# Patient Record
(demographics unavailable — no encounter records)

---

## 2025-01-16 NOTE — PHYSICAL EXAM
[FreeTextEntry3] : inflamed red papules and pustules and comedones on the cheeks and forhead face mild scarring

## 2025-01-16 NOTE — ASSESSMENT
[Use of independent historian: [ enter independent historian's relationship to patient ] :____] : As the patient was unable to provide a complete and reliable history, I obtained clinical history from the patient's [unfilled] [FreeTextEntry1] : #Acne vulgaris, inflammatory, moderate - chronic flaring  - Discussed nature, chronicity and unpredictable course - We have discussed importance of sun protection  - c/w tretinoin 0.05% cream nightly for now, will stop next mo  - Plan to start accutane in 1 mo  - Abstinent  - Ipledge ID: REMS ID [id number]  # High risk medication use - Baseline hepatic profile, lipid profile ( 01/14/2025 ), will repeat following max dose - Negative urine POCT urine pregnancy test today  -- Extensive discussion with the patient regarding Accutane. SED including but not limited to dryness of the skin/mucosa, bone or muscle pain, elevation in triglycerides, need for periodic lab monitoring. Concern for madhavi depression and suicidality has not been proven to the extent of a causal effect. Also possible association for IBD. -- Patient should not have cosmetic and/or elective procedures during treatment, should not share this medicine, cannot donate blood while on this medicine or for one month after treatment, and should not drink while taking this medication. -- Patient should not take tetracyclines while on this medication or use any other topicals while on this medication. Counseled to use gentle cleanser, apply daily sun protection, and frequent emollient use. Patient should take this medication with food (ie fatty meal). -- Patient is aware of teratogenic risk if female takes medication. Patient verbalizes understanding of all. Counseling regarding pregnancy prevention done. Accutane is Category X for pregnancy, meaning it will cause fetal teratogenic malformations, and pregnancy MUST be avoided while on this drug. Patient is aware of the risk of birth defects while on isotretinoin and has pledged to abstinence during treatment and for at least one month after her last dose.   No history of depression/suicidal ideation. No personal of family history of IBD.

## 2025-01-16 NOTE — HISTORY OF PRESENT ILLNESS
[FreeTextEntry1] : amy acne [de-identified] : YULI HEDRICK  is a 15 year old F who presents for the following: #Acne vulgaris: using tret 0.05% daily, still flaring, wants to start accutane, not related to period that she can tell, not on BC, abstinent  Prior hx:  - In the past was tx with doxycycline and "many" other creams from outside derm - prev using non-comedogenic cosmetics and acne has significantly improved   No personal history of depression/anxiety/SI.  No personal or family history of IBD.   SH: in 10th grade with Dr. Crum's son

## 2025-01-16 NOTE — HISTORY OF PRESENT ILLNESS
[FreeTextEntry1] : amy acne [de-identified] : YULI HEDRICK  is a 15 year old F who presents for the following: #Acne vulgaris: using tret 0.05% daily, still flaring, wants to start accutane, not related to period that she can tell, not on BC, abstinent  Prior hx:  - In the past was tx with doxycycline and "many" other creams from outside derm - prev using non-comedogenic cosmetics and acne has significantly improved   No personal history of depression/anxiety/SI.  No personal or family history of IBD.   SH: in 10th grade with Dr. Crum's son

## 2025-01-16 NOTE — ASSESSMENT
[Use of independent historian: [ enter independent historian's relationship to patient ] :____] : As the patient was unable to provide a complete and reliable history, I obtained clinical history from the patient's [unfilled] [FreeTextEntry1] : #Acne vulgaris, inflammatory, moderate - chronic flaring  - Discussed nature, chronicity and unpredictable course - We have discussed importance of sun protection  - c/w tretinoin 0.05% cream nightly for now, will stop next mo  - Plan to start accutane in 1 mo  - Abstinent  - Ipledge ID: REMS ID [id number]  # High risk medication use - Baseline hepatic profile, lipid profile ( 01/14/2025 ), will repeat following max dose - Negative urine POCT urine pregnancy test today  -- Extensive discussion with the patient regarding Accutane. SED including but not limited to dryness of the skin/mucosa, bone or muscle pain, elevation in triglycerides, need for periodic lab monitoring. Concern for madhavi depression and suicidality has not been proven to the extent of a causal effect. Also possible association for IBD. -- Patient should not have cosmetic and/or elective procedures during treatment, should not share this medicine, cannot donate blood while on this medicine or for one month after treatment, and should not drink while taking this medication. -- Patient should not take tetracyclines while on this medication or use any other topicals while on this medication. Counseled to use gentle cleanser, apply daily sun protection, and frequent emollient use. Patient should take this medication with food (ie fatty meal). -- Patient is aware of teratogenic risk if female takes medication. Patient verbalizes understanding of all. Counseling regarding pregnancy prevention done. Accutane is Category X for pregnancy, meaning it will cause fetal teratogenic malformations, and pregnancy MUST be avoided while on this drug. Patient is aware of the risk of birth defects while on isotretinoin and has pledged to abstinence during treatment and for at least one month after her last dose.   No history of depression/suicidal ideation. No personal of family history of IBD.

## 2025-02-18 NOTE — HISTORY OF PRESENT ILLNESS
[FreeTextEntry1] : amy acne [de-identified] : 15-year-old F last seen one month ago, presenting today with her mom for follow-up AV. Here to start isotretinoin. In the past was tx with doxycycline and "many" other creams from outside derm. No improvement with tret 0.05%. No OCP- endorses abstinence. No personal history of depression/anxiety/SI.  No personal or family history of IBD.   SH: in 10th grade with Dr. Crum's son

## 2025-02-18 NOTE — ASSESSMENT
[Use of independent historian: [ enter independent historian's relationship to patient ] :____] : As the patient was unable to provide a complete and reliable history, I obtained clinical history from the patient's [unfilled] [FreeTextEntry1] : 1) Acne vulgaris, inflammatory, moderate - chronic flaring  - Discussed nature, chronicity and unpredictable course - Stop tretinoin 0.05% cream  - Weight: 61.24 kg  - Abstinent  - Ipledge ID: REMS ID [id number]  I confirmed Patient in iPledge myself, assisted in setting up iPledge account, and had her answer questions in the room before sending medication.   Start isotretinoin 20 mg PO daily, take with a fatty meal.  2) High risk medication use - Baseline hepatic profile and lipid profile 01/14/2025 WNL.  - Negative urine POCT urine pregnancy test today  -- Extensive discussion with the patient regarding Accutane. SED including but not limited to dryness of the skin/mucosa, bone or muscle pain, elevation in triglycerides, need for periodic lab monitoring. Concern for madhavi depression and suicidality has not been proven to the extent of a causal effect. Also possible association for IBD. -- Patient should not have cosmetic and/or elective procedures during treatment, should not share this medicine, cannot donate blood while on this medicine or for one month after treatment, and should not drink while taking this medication. -- Patient should not take tetracyclines while on this medication or use any other topicals while on this medication. Counseled to use gentle cleanser, apply daily sun protection, and frequent emollient use. Patient should take this medication with food (ie fatty meal). -- Patient is aware of teratogenic risk if female takes medication. Patient verbalizes understanding of all. Counseling regarding pregnancy prevention done. Accutane is Category X for pregnancy, meaning it will cause fetal teratogenic malformations, and pregnancy MUST be avoided while on this drug. Patient is aware of the risk of birth defects while on isotretinoin and has pledged to abstinence during treatment and for at least one month after her last dose.   No history of depression/suicidal ideation. No personal of family history of IBD.  RTC 1 month

## 2025-02-18 NOTE — PHYSICAL EXAM
[FreeTextEntry3] : Focused skin exam performed  The relevant portions of the exam were performed today  AAOx3, NAD, well-appearing / pleasant Focused examination within normal limits with the exception of:  inflamed red papules and pustules and comedones on the cheeks and forehead mild scarring

## 2025-02-18 NOTE — ASSESSMENT
[Use of independent historian: [ enter independent historian's relationship to patient ] :____] : As the patient was unable to provide a complete and reliable history, I obtained clinical history from the patient's [unfilled] [FreeTextEntry1] : 1) Acne vulgaris, inflammatory, moderate - chronic flaring  - Discussed nature, chronicity and unpredictable course - Stop tretinoin 0.05% cream  - Weight: 61.24 kg  - Abstinent  - Ipledge ID: REMS ID [id number]  I confirmed Patient in iPledge myself, assisted in setting up iPledge account, and had her answer questions in the room before sending medication.   Start isotretinoin 20 mg PO daily, take with a fatty meal.  2) High risk medication use - Baseline hepatic profile and lipid profile 01/14/2025 WNL.  - Negative urine POCT urine pregnancy test today  -- Extensive discussion with the patient regarding Accutane. SED including but not limited to dryness of the skin/mucosa, bone or muscle pain, elevation in triglycerides, need for periodic lab monitoring. Concern for madhavi depression and suicidality has not been proven to the extent of a causal effect. Also possible association for IBD. -- Patient should not have cosmetic and/or elective procedures during treatment, should not share this medicine, cannot donate blood while on this medicine or for one month after treatment, and should not drink while taking this medication. -- Patient should not take tetracyclines while on this medication or use any other topicals while on this medication. Counseled to use gentle cleanser, apply daily sun protection, and frequent emollient use. Patient should take this medication with food (ie fatty meal). -- Patient is aware of teratogenic risk if female takes medication. Patient verbalizes understanding of all. Counseling regarding pregnancy prevention done. Accutane is Category X for pregnancy, meaning it will cause fetal teratogenic malformations, and pregnancy MUST be avoided while on this drug. Patient is aware of the risk of birth defects while on isotretinoin and has pledged to abstinence during treatment and for at least one month after her last dose.   No history of depression/suicidal ideation. No personal of family history of IBD.  RTC 1 month

## 2025-02-18 NOTE — END OF VISIT
[Time Spent: ___ minutes] : I have spent [unfilled] minutes of time on the encounter which excludes teaching and separately reported services. [TextEntry] : Physician Assistant Statement: Case was discussed and supervised by attending physician.

## 2025-02-18 NOTE — HISTORY OF PRESENT ILLNESS
[FreeTextEntry1] : amy acne [de-identified] : 15-year-old F last seen one month ago, presenting today with her mom for follow-up AV. Here to start isotretinoin. In the past was tx with doxycycline and "many" other creams from outside derm. No improvement with tret 0.05%. No OCP- endorses abstinence. No personal history of depression/anxiety/SI.  No personal or family history of IBD.   SH: in 10th grade with Dr. Crum's son

## 2025-03-13 NOTE — PHYSICAL EXAM
[FreeTextEntry3] : Focused skin exam performed  The relevant portions of the exam were performed today  AAOx3, NAD, well-appearing / pleasant Focused examination within normal limits with the exception of:  Unable to examine face due to makeup but able to appreciate inflammatory papules on the forehead and cheeks

## 2025-03-13 NOTE — HISTORY OF PRESENT ILLNESS
[FreeTextEntry1] : amy acne [de-identified] : 16-year-old F last seen one month ago, presenting today with her mom for follow-up AV. S/p 1 month of isotretinoin at 20 mg PO daily, tolerating well. Notes dryness on the lips, no pain or itching. Denies joint pain, muscle aches, abdominal pain, bloody diarrhea, mood changes, and headaches.   History In the past was tx with doxycycline and "many" other creams from outside derm. No improvement with tret 0.05%. No OCP- endorses abstinence. No personal history of depression/anxiety/SI.  No personal or family history of IBD.   SH: in 10th grade with Dr. Crum's son

## 2025-03-13 NOTE — ASSESSMENT
[Use of independent historian: [ enter independent historian's relationship to patient ] :____] : As the patient was unable to provide a complete and reliable history, I obtained clinical history from the patient's [unfilled] [FreeTextEntry1] : 1) Acne vulgaris, inflammatory, moderate - chronic flaring  - Discussed nature, chronicity and unpredictable course - Stop tretinoin 0.05% cream  - Cumulative dose: 600 mg - Goal dose: 7,320 mg - 9,150 mg (120-150 mg/kg) - Weight: 61.24 kg  - Abstinent  - Ipledge ID: REMS ID [id number]  Pending normal labs below, increase isotretinoin 40 mg PO daily, take with a fatty meal. I confirmed Patient in iPledge myself.  2) High risk medication use - Repeat CBC, CMP, and lipids today. - Baseline hepatic profile and lipid profile 01/14/2025 WNL.  - Negative urine POCT urine pregnancy test today  -- Extensive discussion with the patient regarding Accutane. SED including but not limited to dryness of the skin/mucosa, bone or muscle pain, elevation in triglycerides, need for periodic lab monitoring. Concern for madhavi depression and suicidality has not been proven to the extent of a causal effect. Also possible association for IBD. -- Patient should not have cosmetic and/or elective procedures during treatment, should not share this medicine, cannot donate blood while on this medicine or for one month after treatment, and should not drink while taking this medication. -- Patient should not take tetracyclines while on this medication or use any other topicals while on this medication. Counseled to use gentle cleanser, apply daily sun protection, and frequent emollient use. Patient should take this medication with food (ie fatty meal). -- Patient is aware of teratogenic risk if female takes medication. Patient verbalizes understanding of all. Counseling regarding pregnancy prevention done. Accutane is Category X for pregnancy, meaning it will cause fetal teratogenic malformations, and pregnancy MUST be avoided while on this drug. Patient is aware of the risk of birth defects while on isotretinoin and has pledged to abstinence during treatment and for at least one month after her last dose.   No history of depression/suicidal ideation. No personal of family history of IBD.  RTC 1 month

## 2025-03-13 NOTE — HISTORY OF PRESENT ILLNESS
[FreeTextEntry1] : amy acne [de-identified] : 16-year-old F last seen one month ago, presenting today with her mom for follow-up AV. S/p 1 month of isotretinoin at 20 mg PO daily, tolerating well. Notes dryness on the lips, no pain or itching. Denies joint pain, muscle aches, abdominal pain, bloody diarrhea, mood changes, and headaches.   History In the past was tx with doxycycline and "many" other creams from outside derm. No improvement with tret 0.05%. No OCP- endorses abstinence. No personal history of depression/anxiety/SI.  No personal or family history of IBD.   SH: in 10th grade with Dr. Crum's son

## 2025-03-13 NOTE — ASSESSMENT
[Use of independent historian: [ enter independent historian's relationship to patient ] :____] : As the patient was unable to provide a complete and reliable history, I obtained clinical history from the patient's [unfilled] [FreeTextEntry1] : 1) Acne vulgaris, inflammatory, moderate - chronic flaring  - Discussed nature, chronicity and unpredictable course - Stop tretinoin 0.05% cream  - Cumulative dose: 600 mg - Goal dose: 7,320 mg - 9,150 mg (120-150 mg/kg) - Weight: 61.24 kg  - Abstinent  - Ipledge ID: REMS ID [id number]  Pending normal labs below, increase isotretinoin 40 mg PO daily, take with a fatty meal. I confirmed Patient in iPledge myself.  2) High risk medication use - Repeat CBC, CMP, and lipids today. - Baseline hepatic profile and lipid profile 01/14/2025 WNL.  - Negative urine POCT urine pregnancy test today  -- Extensive discussion with the patient regarding Accutane. SED including but not limited to dryness of the skin/mucosa, bone or muscle pain, elevation in triglycerides, need for periodic lab monitoring. Concern for madhavi depression and suicidality has not been proven to the extent of a causal effect. Also possible association for IBD. -- Patient should not have cosmetic and/or elective procedures during treatment, should not share this medicine, cannot donate blood while on this medicine or for one month after treatment, and should not drink while taking this medication. -- Patient should not take tetracyclines while on this medication or use any other topicals while on this medication. Counseled to use gentle cleanser, apply daily sun protection, and frequent emollient use. Patient should take this medication with food (ie fatty meal). -- Patient is aware of teratogenic risk if female takes medication. Patient verbalizes understanding of all. Counseling regarding pregnancy prevention done. Accutane is Category X for pregnancy, meaning it will cause fetal teratogenic malformations, and pregnancy MUST be avoided while on this drug. Patient is aware of the risk of birth defects while on isotretinoin and has pledged to abstinence during treatment and for at least one month after her last dose.   No history of depression/suicidal ideation. No personal of family history of IBD.  RTC 1 month

## 2025-04-10 NOTE — HISTORY OF PRESENT ILLNESS
[FreeTextEntry1] : amy acne [de-identified] : 16-year-old F last seen one month ago, presenting today with her mom for follow-up AV. S/p 2 months of isotretinoin now at 40 mg PO daily, tolerating well. Notes dryness on the lips, no pain or itching. Denies joint pain, muscle aches, abdominal pain, bloody diarrhea, mood changes, and headaches.   History In the past was tx with doxycycline and "many" other creams from outside derm. No improvement with tret 0.05%. No OCP- endorses abstinence. No personal history of depression/anxiety/SI.  No personal or family history of IBD.   SH: in 10th grade with Dr. Crum's son

## 2025-04-10 NOTE — ASSESSMENT
[Use of independent historian: [ enter independent historian's relationship to patient ] :____] : As the patient was unable to provide a complete and reliable history, I obtained clinical history from the patient's [unfilled] [FreeTextEntry1] : 1) Acne vulgaris, inflammatory, moderate - chronic flaring  - Discussed nature, chronicity and unpredictable course - Stop tretinoin 0.05% cream  - Cumulative dose: 1800 mg - Goal dose: 7,320 mg - 9,150 mg (120-150 mg/kg) - Weight: 61.24 kg  - Abstinent  - Ipledge ID: REMS ID [id number]  Increase isotretinoin to 60 mg PO daily, take with a fatty meal.  I confirmed Patient in iPledge myself.  2) High risk medication use - CBC, lipids, and CMP WNL from . - Baseline hepatic profile and lipid profile 01/14/2025 WNL.  - Negative urine POCT urine pregnancy test today - Repeat CBC, lipids, and CMP at NEXT visit.  -- Extensive discussion with the patient regarding Accutane. SED including but not limited to dryness of the skin/mucosa, bone or muscle pain, elevation in triglycerides, need for periodic lab monitoring. Concern for madhavi depression and suicidality has not been proven to the extent of a causal effect. Also possible association for IBD. -- Patient should not have cosmetic and/or elective procedures during treatment, should not share this medicine, cannot donate blood while on this medicine or for one month after treatment, and should not drink while taking this medication. -- Patient should not take tetracyclines while on this medication or use any other topicals while on this medication. Counseled to use gentle cleanser, apply daily sun protection, and frequent emollient use. Patient should take this medication with food (ie fatty meal). -- Patient is aware of teratogenic risk if female takes medication. Patient verbalizes understanding of all. Counseling regarding pregnancy prevention done. Accutane is Category X for pregnancy, meaning it will cause fetal teratogenic malformations, and pregnancy MUST be avoided while on this drug. Patient is aware of the risk of birth defects while on isotretinoin and has pledged to abstinence during treatment and for at least one month after her last dose.   No history of depression/suicidal ideation. No personal of family history of IBD.  RTC 1 month

## 2025-04-10 NOTE — HISTORY OF PRESENT ILLNESS
[FreeTextEntry1] : amy acne [de-identified] : 16-year-old F last seen one month ago, presenting today with her mom for follow-up AV. S/p 2 months of isotretinoin now at 40 mg PO daily, tolerating well. Notes dryness on the lips, no pain or itching. Denies joint pain, muscle aches, abdominal pain, bloody diarrhea, mood changes, and headaches.   History In the past was tx with doxycycline and "many" other creams from outside derm. No improvement with tret 0.05%. No OCP- endorses abstinence. No personal history of depression/anxiety/SI.  No personal or family history of IBD.   SH: in 10th grade with Dr. Crum's son

## 2025-04-10 NOTE — END OF VISIT
Winlevi Counseling:  I discussed with the patient the risks of topical clascoterone including but not limited to erythema, scaling, itching, and stinging. Patient voiced their understanding. Birth Control Pills Counseling: Birth Control Pill Counseling: I discussed with the patient the potential side effects of OCPs including but not limited to increased risk of stroke, heart attack, thrombophlebitis, deep venous thrombosis, hepatic adenomas, breast changes, GI upset, headaches, and depression. The patient verbalized understanding of the proper use and possible adverse effects of OCPs. All of the patient's questions and concerns were addressed. Doxycycline Counseling:  Patient counseled regarding possible photosensitivity and increased risk for sunburn. Patient instructed to avoid sunlight, if possible. When exposed to sunlight, patients should wear protective clothing, sunglasses, and sunscreen. The patient was instructed to call the office immediately if the following severe adverse effects occur:  hearing changes, easy bruising/bleeding, severe headache, or vision changes. The patient verbalized understanding of the proper use and possible adverse effects of doxycycline. All of the patient's questions and concerns were addressed. [TextEntry] : Physician Assistant Statement: Case was discussed and supervised by attending physician. Topical Clindamycin Pregnancy And Lactation Text: This medication is Pregnancy Category B and is considered safe during pregnancy. It is unknown if it is excreted in breast milk. Isotretinoin Pregnancy And Lactation Text: This medication is Pregnancy Category X and is considered extremely dangerous during pregnancy. It is unknown if it is excreted in breast milk. Minocycline Pregnancy And Lactation Text: This medication is Pregnancy Category D and not consider safe during pregnancy. It is also excreted in breast milk. Tetracycline Counseling: Patient counseled regarding possible photosensitivity and increased risk for sunburn. Patient instructed to avoid sunlight, if possible. When exposed to sunlight, patients should wear protective clothing, sunglasses, and sunscreen. The patient was instructed to call the office immediately if the following severe adverse effects occur:  hearing changes, easy bruising/bleeding, severe headache, or vision changes. The patient verbalized understanding of the proper use and possible adverse effects of tetracycline. All of the patient's questions and concerns were addressed. Patient understands to avoid pregnancy while on therapy due to potential birth defects. Include Pregnancy/Lactation Warning?: No Azelaic Acid Pregnancy And Lactation Text: This medication is considered safe during pregnancy and breast feeding. Topical Retinoid counseling:  Patient advised to apply a pea-sized amount only at bedtime and wait 30 minutes after washing their face before applying. If too drying, patient may add a non-comedogenic moisturizer. The patient verbalized understanding of the proper use and possible adverse effects of retinoids. All of the patient's questions and concerns were addressed. Tazorac Counseling:  Patient advised that medication is irritating and drying. Patient may need to apply sparingly and wash off after an hour before eventually leaving it on overnight. The patient verbalized understanding of the proper use and possible adverse effects of tazorac. All of the patient's questions and concerns were addressed. Topical Sulfur Applications Counseling: Topical Sulfur Counseling: Patient counseled that this medication may cause skin irritation or allergic reactions. In the event of skin irritation, the patient was advised to reduce the amount of the drug applied or use it less frequently. The patient verbalized understanding of the proper use and possible adverse effects of topical sulfur application. All of the patient's questions and concerns were addressed. Winlevi Pregnancy And Lactation Text: This medication is considered safe during pregnancy and breastfeeding. Birth Control Pills Pregnancy And Lactation Text: This medication should be avoided if pregnant and for the first 30 days post-partum. Doxycycline Pregnancy And Lactation Text: This medication is Pregnancy Category D and not consider safe during pregnancy. It is also excreted in breast milk but is considered safe for shorter treatment courses. Azithromycin Counseling:  I discussed with the patient the risks of azithromycin including but not limited to GI upset, allergic reaction, drug rash, diarrhea, and yeast infections. High Dose Vitamin A Counseling: Side effects reviewed, pt to contact office should one occur. Detail Level: Simple Topical Retinoid Pregnancy And Lactation Text: This medication is Pregnancy Category C. It is unknown if this medication is excreted in breast milk. Sarecycline Counseling: Patient advised regarding possible photosensitivity and discoloration of the teeth, skin, lips, tongue and gums. Patient instructed to avoid sunlight, if possible. When exposed to sunlight, patients should wear protective clothing, sunglasses, and sunscreen. The patient was instructed to call the office immediately if the following severe adverse effects occur:  hearing changes, easy bruising/bleeding, severe headache, or vision changes. The patient verbalized understanding of the proper use and possible adverse effects of sarecycline. All of the patient's questions and concerns were addressed. Spironolactone Counseling: Patient advised regarding risks of diarrhea, abdominal pain, hyperkalemia, birth defects (for female patients), liver toxicity and renal toxicity. The patient may need blood work to monitor liver and kidney function and potassium levels while on therapy. The patient verbalized understanding of the proper use and possible adverse effects of spironolactone. All of the patient's questions and concerns were addressed. Erythromycin Pregnancy And Lactation Text: This medication is Pregnancy Category B and is considered safe during pregnancy. It is also excreted in breast milk. Benzoyl Peroxide Counseling: Patient counseled that medicine may cause skin irritation and bleach clothing. In the event of skin irritation, the patient was advised to reduce the amount of the drug applied or use it less frequently. The patient verbalized understanding of the proper use and possible adverse effects of benzoyl peroxide. All of the patient's questions and concerns were addressed. High Dose Vitamin A Pregnancy And Lactation Text: High dose vitamin A therapy is contraindicated during pregnancy and breast feeding. Tazorac Pregnancy And Lactation Text: This medication is not safe during pregnancy. It is unknown if this medication is excreted in breast milk. Azithromycin Pregnancy And Lactation Text: This medication is considered safe during pregnancy and is also secreted in breast milk. Dapsone Counseling: I discussed with the patient the risks of dapsone including but not limited to hemolytic anemia, agranulocytosis, rashes, methemoglobinemia, kidney failure, peripheral neuropathy, headaches, GI upset, and liver toxicity. Patients who start dapsone require monitoring including baseline LFTs and weekly CBCs for the first month, then every month thereafter. The patient verbalized understanding of the proper use and possible adverse effects of dapsone. All of the patient's questions and concerns were addressed. Topical Sulfur Applications Pregnancy And Lactation Text: This medication is Pregnancy Category C and has an unknown safety profile during pregnancy. It is unknown if this topical medication is excreted in breast milk. Erythromycin Counseling:  I discussed with the patient the risks of erythromycin including but not limited to GI upset, allergic reaction, drug rash, diarrhea, increase in liver enzymes, and yeast infections. Isotretinoin Counseling: Patient should get monthly blood tests, not donate blood, not drive at night if vision affected, not share medication, and not undergo elective surgery for 6 months after tx completed. Side effects reviewed, pt to contact office should one occur. Bactrim Pregnancy And Lactation Text: This medication is Pregnancy Category D and is known to cause fetal risk. It is also excreted in breast milk. Topical Clindamycin Counseling: Patient counseled that this medication may cause skin irritation or allergic reactions. In the event of skin irritation, the patient was advised to reduce the amount of the drug applied or use it less frequently. The patient verbalized understanding of the proper use and possible adverse effects of clindamycin. All of the patient's questions and concerns were addressed. Minocycline Counseling: Patient advised regarding possible photosensitivity and discoloration of the teeth, skin, lips, tongue and gums. Patient instructed to avoid sunlight, if possible. When exposed to sunlight, patients should wear protective clothing, sunglasses, and sunscreen. The patient was instructed to call the office immediately if the following severe adverse effects occur:  hearing changes, easy bruising/bleeding, severe headache, or vision changes. The patient verbalized understanding of the proper use and possible adverse effects of minocycline. All of the patient's questions and concerns were addressed. Spironolactone Pregnancy And Lactation Text: This medication can cause feminization of the male fetus and should be avoided during pregnancy. The active metabolite is also found in breast milk. Dapsone Pregnancy And Lactation Text: This medication is Pregnancy Category C and is not considered safe during pregnancy or breast feeding. Benzoyl Peroxide Pregnancy And Lactation Text: This medication is Pregnancy Category C. It is unknown if benzoyl peroxide is excreted in breast milk. Azelaic Acid Counseling: Patient counseled that medicine may cause skin irritation and to avoid applying near the eyes. In the event of skin irritation, the patient was advised to reduce the amount of the drug applied or use it less frequently. The patient verbalized understanding of the proper use and possible adverse effects of azelaic acid. All of the patient's questions and concerns were addressed. Bactrim Counseling:  I discussed with the patient the risks of sulfa antibiotics including but not limited to GI upset, allergic reaction, drug rash, diarrhea, dizziness, photosensitivity, and yeast infections. Rarely, more serious reactions can occur including but not limited to aplastic anemia, agranulocytosis, methemoglobinemia, blood dyscrasias, liver or kidney failure, lung infiltrates or desquamative/blistering drug rashes. Detail Level: Zone Detail Level: Detailed

## 2025-04-10 NOTE — ASSESSMENT
[Use of independent historian: [ enter independent historian's relationship to patient ] :____] : As the patient was unable to provide a complete and reliable history, I obtained clinical history from the patient's [unfilled] [FreeTextEntry1] : 1) Acne vulgaris, inflammatory, moderate - chronic flaring  - Discussed nature, chronicity and unpredictable course - Stop tretinoin 0.05% cream  - Cumulative dose: 1800 mg - Goal dose: 7,320 mg - 9,150 mg (120-150 mg/kg) - Weight: 61.24 kg  - Abstinent  - Ipledge ID: REMS ID [id number]  Increase isotretinoin to 60 mg PO daily, take with a fatty meal.  I confirmed Patient in iPledge myself.  2) High risk medication use - CBC, lipids, and CMP WNL from . - Baseline hepatic profile and lipid profile 01/14/2025 WNL.  - Negative urine POCT urine pregnancy test today - Repeat CBC, lipids, and CMP at NEXT visit.  -- Extensive discussion with the patient regarding Accutane. SED including but not limited to dryness of the skin/mucosa, bone or muscle pain, elevation in triglycerides, need for periodic lab monitoring. Concern for madhavi depression and suicidality has not been proven to the extent of a causal effect. Also possible association for IBD. -- Patient should not have cosmetic and/or elective procedures during treatment, should not share this medicine, cannot donate blood while on this medicine or for one month after treatment, and should not drink while taking this medication. -- Patient should not take tetracyclines while on this medication or use any other topicals while on this medication. Counseled to use gentle cleanser, apply daily sun protection, and frequent emollient use. Patient should take this medication with food (ie fatty meal). -- Patient is aware of teratogenic risk if female takes medication. Patient verbalizes understanding of all. Counseling regarding pregnancy prevention done. Accutane is Category X for pregnancy, meaning it will cause fetal teratogenic malformations, and pregnancy MUST be avoided while on this drug. Patient is aware of the risk of birth defects while on isotretinoin and has pledged to abstinence during treatment and for at least one month after her last dose.   No history of depression/suicidal ideation. No personal of family history of IBD.  RTC 1 month

## 2025-04-10 NOTE — PHYSICAL EXAM
[FreeTextEntry3] : Focused skin exam performed  The relevant portions of the exam were performed today  AAOx3, NAD, well-appearing / pleasant Focused examination within normal limits with the exception of:  Few pink inflammatory papules and comedones on the forehead and cheeks, no make-up on exam today- greatly improved from last visit

## 2025-05-09 NOTE — ASSESSMENT
[Use of independent historian: [ enter independent historian's relationship to patient ] :____] : As the patient was unable to provide a complete and reliable history, I obtained clinical history from the patient's [unfilled] [FreeTextEntry1] : 1) Acne vulgaris, inflammatory, moderate - chronic flaring  - Discussed nature, chronicity and unpredictable course - Stop tretinoin 0.05% cream  - Cumulative dose: 3,600 mg - Goal dose: 7,320 mg - 9,150 mg (120-150 mg/kg) - Weight: 61.24 kg  - Abstinent  - Ipledge ID: REMS ID [id number]  C/w isotretinoin to 60 mg PO daily, take with a fatty meal.  I confirmed Patient in iPledge myself.  2) High risk medication use - CBC, lipids, and CMP WNL from . - Baseline hepatic profile and lipid profile 01/14/2025 WNL.  - Negative urine POCT urine pregnancy test today - Repeat CBC, lipids, and CMP today.  -- Extensive discussion with the patient regarding Accutane. SED including but not limited to dryness of the skin/mucosa, bone or muscle pain, elevation in triglycerides, need for periodic lab monitoring. Concern for madhavi depression and suicidality has not been proven to the extent of a causal effect. Also possible association for IBD. -- Patient should not have cosmetic and/or elective procedures during treatment, should not share this medicine, cannot donate blood while on this medicine or for one month after treatment, and should not drink while taking this medication. -- Patient should not take tetracyclines while on this medication or use any other topicals while on this medication. Counseled to use gentle cleanser, apply daily sun protection, and frequent emollient use. Patient should take this medication with food (ie fatty meal). -- Patient is aware of teratogenic risk if female takes medication. Patient verbalizes understanding of all. Counseling regarding pregnancy prevention done. Accutane is Category X for pregnancy, meaning it will cause fetal teratogenic malformations, and pregnancy MUST be avoided while on this drug. Patient is aware of the risk of birth defects while on isotretinoin and has pledged to abstinence during treatment and for at least one month after her last dose.   No history of depression/suicidal ideation. No personal of family history of IBD.  RTC 1 month

## 2025-05-09 NOTE — PHYSICAL EXAM
[FreeTextEntry3] : Focused skin exam performed  The relevant portions of the exam were performed today  AAOx3, NAD, well-appearing / pleasant Focused examination within normal limits with the exception of:  Few pink inflammatory papules especially clustered on the left medial cheek- exam limited by make-up

## 2025-05-09 NOTE — HISTORY OF PRESENT ILLNESS
[FreeTextEntry1] : amy acne [de-identified] : 16-year-old F last seen one month ago, presenting today with her mom for follow-up AV. S/p 3 months of isotretinoin now at 60 mg PO daily, tolerating well. Break out on the left cheek. Notes dryness on the lips, no pain or itching. Denies joint pain, muscle aches, abdominal pain, bloody diarrhea, mood changes, and headaches.   History In the past was tx with doxycycline and "many" other creams from outside derm. No improvement with tret 0.05%. No OCP- endorses abstinence. No personal history of depression/anxiety/SI.  No personal or family history of IBD.   SH: in 10th grade with Dr. Crum's son

## 2025-06-09 NOTE — ASSESSMENT
[Use of independent historian: [ enter independent historian's relationship to patient ] :____] : As the patient was unable to provide a complete and reliable history, I obtained clinical history from the patient's [unfilled] [FreeTextEntry1] : 1) Acne vulgaris, inflammatory, moderate - chronic flaring  - Discussed nature, chronicity and unpredictable course - Stop tretinoin 0.05% cream  - Cumulative dose: 5,400 mg (85 mg/kg) - Goal dose: 7,320 mg - 9,150 mg (120-150 mg/kg) - Weight: 63.5 kg  - Abstinent  - Ipledge ID: REMS ID [id number]  C/w isotretinoin to 60 mg PO daily, take with a fatty meal.   I confirmed Patient in iPledge myself.  2) High risk medication use - CBC, lipids, and CMP WNL from . - Baseline hepatic profile and lipid profile 01/14/2025 WNL.  - Negative urine POCT urine pregnancy test today - Reviewed CBC, CMP, and lipids from last visit - WNL  -- Extensive discussion with the patient regarding Accutane. SED including but not limited to dryness of the skin/mucosa, bone or muscle pain, elevation in triglycerides, need for periodic lab monitoring. Concern for madhavi depression and suicidality has not been proven to the extent of a causal effect. Also possible association for IBD. -- Patient should not have cosmetic and/or elective procedures during treatment, should not share this medicine, cannot donate blood while on this medicine or for one month after treatment, and should not drink while taking this medication. -- Patient should not take tetracyclines while on this medication or use any other topicals while on this medication. Counseled to use gentle cleanser, apply daily sun protection, and frequent emollient use. Patient should take this medication with food (ie fatty meal). -- Patient is aware of teratogenic risk if female takes medication. Patient verbalizes understanding of all. Counseling regarding pregnancy prevention done. Accutane is Category X for pregnancy, meaning it will cause fetal teratogenic malformations, and pregnancy MUST be avoided while on this drug. Patient is aware of the risk of birth defects while on isotretinoin and has pledged to abstinence during treatment and for at least one month after her last dose.   No history of depression/suicidal ideation. No personal of family history of IBD.  RTC 1 month

## 2025-06-09 NOTE — HISTORY OF PRESENT ILLNESS
[FreeTextEntry1] : amy acne [de-identified] : 16-year-old F last seen one month ago, presenting today with her mom for follow-up AV. S/p 4 months of isotretinoin now at 60 mg PO daily, tolerating well. Break out on the left cheek. Notes dryness on the lips, no pain or itching. Denies joint pain, muscle aches, abdominal pain, bloody diarrhea, mood changes, and headaches.   History In the past was tx with doxycycline and "many" other creams from outside derm. No improvement with tret 0.05%. No OCP- endorses abstinence. No personal history of depression/anxiety/SI.  No personal or family history of IBD.   SH: in 10th grade with Dr. Crum's son

## 2025-07-10 NOTE — HISTORY OF PRESENT ILLNESS
[FreeTextEntry1] : amy acne [de-identified] : 16-year-old F last seen one month ago, presenting today with her mom for follow-up AV. S/p 5 months of isotretinoin now at 60 mg PO daily, tolerating well.  Notes breakouts on the right chin. Not happy with results so far- would like skin to be clear. Denies joint pain, muscle aches, abdominal pain, bloody diarrhea, mood changes, and headaches.   History In the past was tx with doxycycline and "many" other creams from outside derm. No improvement with tret 0.05%. No OCP- endorses abstinence. No personal history of depression/anxiety/SI.  No personal or family history of IBD.   SH: in 10th grade with Dr. Crum's son

## 2025-07-10 NOTE — ASSESSMENT
[Use of independent historian: [ enter independent historian's relationship to patient ] :____] : As the patient was unable to provide a complete and reliable history, I obtained clinical history from the patient's [unfilled] [FreeTextEntry1] : 1) Acne vulgaris, inflammatory, moderate - chronic flaring  - Discussed nature, chronicity and unpredictable course - Stop tretinoin 0.05% cream  - Cumulative dose: 7,200 mg (109.5 mg/kg) - Goal dose: 7,892.4 mg - 9, 865.5 mg (120-150 mg/kg) - Weight: 65.77 kg *Weight increase today - Abstinent  - Ipledge ID: REMS ID [id number]  Increase isotretinoin to 80 mg PO daily, take with a fatty meal.   I confirmed Patient in iPledge myself.  2) High risk medication use - CBC, lipids, and CMP WNL from . - Baseline hepatic profile and lipid profile 01/14/2025 WNL.  - Negative urine POCT urine pregnancy test today - Reviewed CBC, CMP, and lipids from May 2025 - Repeat ALT and TGs at next visit.  -- Extensive discussion with the patient regarding Accutane. SED including but not limited to dryness of the skin/mucosa, bone or muscle pain, elevation in triglycerides, need for periodic lab monitoring. Concern for madhavi depression and suicidality has not been proven to the extent of a causal effect. Also possible association for IBD. -- Patient should not have cosmetic and/or elective procedures during treatment, should not share this medicine, cannot donate blood while on this medicine or for one month after treatment, and should not drink while taking this medication. -- Patient should not take tetracyclines while on this medication or use any other topicals while on this medication. Counseled to use gentle cleanser, apply daily sun protection, and frequent emollient use. Patient should take this medication with food (ie fatty meal). -- Patient is aware of teratogenic risk if female takes medication. Patient verbalizes understanding of all. Counseling regarding pregnancy prevention done. Accutane is Category X for pregnancy, meaning it will cause fetal teratogenic malformations, and pregnancy MUST be avoided while on this drug. Patient is aware of the risk of birth defects while on isotretinoin and has pledged to abstinence during treatment and for at least one month after her last dose.   No history of depression/suicidal ideation. No personal of family history of IBD.  RTC 1 month

## 2025-07-10 NOTE — HISTORY OF PRESENT ILLNESS
[FreeTextEntry1] : amy acne [de-identified] : 16-year-old F last seen one month ago, presenting today with her mom for follow-up AV. S/p 5 months of isotretinoin now at 60 mg PO daily, tolerating well.  Notes breakouts on the right chin. Not happy with results so far- would like skin to be clear. Denies joint pain, muscle aches, abdominal pain, bloody diarrhea, mood changes, and headaches.   History In the past was tx with doxycycline and "many" other creams from outside derm. No improvement with tret 0.05%. No OCP- endorses abstinence. No personal history of depression/anxiety/SI.  No personal or family history of IBD.   SH: in 10th grade with Dr. Crum's son

## 2025-07-10 NOTE — ASSESSMENT
[Use of independent historian: [ enter independent historian's relationship to patient ] :____] : As the patient was unable to provide a complete and reliable history, I obtained clinical history from the patient's [unfilled] [FreeTextEntry1] : 1) Acne vulgaris, inflammatory, moderate - chronic flaring  - Discussed nature, chronicity and unpredictable course - Stop tretinoin 0.05% cream  - Cumulative dose: 7,200 mg (109.5 mg/kg) - Goal dose: 7,892.4 mg - 9, 865.5 mg (120-150 mg/kg) - Weight: 65.77 kg *Weight increase today - Abstinent  - Ipledge ID: REMS ID [id number]  Increase isotretinoin to 80 mg PO daily, take with a fatty meal.   I confirmed Patient in iPledge myself.  2) High risk medication use - CBC, lipids, and CMP WNL from . - Baseline hepatic profile and lipid profile 01/14/2025 WNL.  - Negative urine POCT urine pregnancy test today - Reviewed CBC, CMP, and lipids from May 2025 - Repeat ALT and TGs at next visit.  -- Extensive discussion with the patient regarding Accutane. SED including but not limited to dryness of the skin/mucosa, bone or muscle pain, elevation in triglycerides, need for periodic lab monitoring. Concern for madhavi depression and suicidality has not been proven to the extent of a causal effect. Also possible association for IBD. -- Patient should not have cosmetic and/or elective procedures during treatment, should not share this medicine, cannot donate blood while on this medicine or for one month after treatment, and should not drink while taking this medication. -- Patient should not take tetracyclines while on this medication or use any other topicals while on this medication. Counseled to use gentle cleanser, apply daily sun protection, and frequent emollient use. Patient should take this medication with food (ie fatty meal). -- Patient is aware of teratogenic risk if female takes medication. Patient verbalizes understanding of all. Counseling regarding pregnancy prevention done. Accutane is Category X for pregnancy, meaning it will cause fetal teratogenic malformations, and pregnancy MUST be avoided while on this drug. Patient is aware of the risk of birth defects while on isotretinoin and has pledged to abstinence during treatment and for at least one month after her last dose.   No history of depression/suicidal ideation. No personal of family history of IBD.  RTC 1 month

## 2025-07-10 NOTE — PHYSICAL EXAM
[FreeTextEntry3] : Focused skin exam performed  The relevant portions of the exam were performed today  AAOx3, NAD, well-appearing / pleasant Focused examination within normal limits with the exception of:  Few pink acneiform papules on b/l medial cheeks, R>>L